# Patient Record
Sex: FEMALE | Race: OTHER | URBAN - METROPOLITAN AREA
[De-identification: names, ages, dates, MRNs, and addresses within clinical notes are randomized per-mention and may not be internally consistent; named-entity substitution may affect disease eponyms.]

---

## 2017-04-04 ENCOUNTER — IMPORTED ENCOUNTER (OUTPATIENT)
Dept: URBAN - METROPOLITAN AREA CLINIC 50 | Facility: CLINIC | Age: 42
End: 2017-04-04

## 2017-09-18 ENCOUNTER — IMPORTED ENCOUNTER (OUTPATIENT)
Dept: URBAN - METROPOLITAN AREA CLINIC 50 | Facility: CLINIC | Age: 42
End: 2017-09-18

## 2018-03-02 ENCOUNTER — IMPORTED ENCOUNTER (OUTPATIENT)
Dept: URBAN - METROPOLITAN AREA CLINIC 50 | Facility: CLINIC | Age: 43
End: 2018-03-02

## 2018-04-09 ENCOUNTER — IMPORTED ENCOUNTER (OUTPATIENT)
Dept: URBAN - METROPOLITAN AREA CLINIC 50 | Facility: CLINIC | Age: 43
End: 2018-04-09

## 2018-05-08 ENCOUNTER — IMPORTED ENCOUNTER (OUTPATIENT)
Dept: URBAN - METROPOLITAN AREA CLINIC 50 | Facility: CLINIC | Age: 43
End: 2018-05-08

## 2018-11-26 ENCOUNTER — IMPORTED ENCOUNTER (OUTPATIENT)
Dept: URBAN - METROPOLITAN AREA CLINIC 50 | Facility: CLINIC | Age: 43
End: 2018-11-26

## 2019-01-07 ENCOUNTER — IMPORTED ENCOUNTER (OUTPATIENT)
Dept: URBAN - METROPOLITAN AREA CLINIC 50 | Facility: CLINIC | Age: 44
End: 2019-01-07

## 2019-01-15 ENCOUNTER — IMPORTED ENCOUNTER (OUTPATIENT)
Dept: URBAN - METROPOLITAN AREA CLINIC 50 | Facility: CLINIC | Age: 44
End: 2019-01-15

## 2019-01-25 ENCOUNTER — IMPORTED ENCOUNTER (OUTPATIENT)
Dept: URBAN - METROPOLITAN AREA CLINIC 50 | Facility: CLINIC | Age: 44
End: 2019-01-25

## 2019-03-05 ENCOUNTER — IMPORTED ENCOUNTER (OUTPATIENT)
Dept: URBAN - METROPOLITAN AREA CLINIC 50 | Facility: CLINIC | Age: 44
End: 2019-03-05

## 2019-03-15 ENCOUNTER — IMPORTED ENCOUNTER (OUTPATIENT)
Dept: URBAN - METROPOLITAN AREA CLINIC 50 | Facility: CLINIC | Age: 44
End: 2019-03-15

## 2019-03-15 NOTE — PATIENT DISCUSSION
"""Trials given to patient today. Patient to call with progress to finalize prescription.  Reviewed ""

## 2019-03-22 ENCOUNTER — IMPORTED ENCOUNTER (OUTPATIENT)
Dept: URBAN - METROPOLITAN AREA CLINIC 50 | Facility: CLINIC | Age: 44
End: 2019-03-22

## 2019-09-24 ENCOUNTER — IMPORTED ENCOUNTER (OUTPATIENT)
Dept: URBAN - METROPOLITAN AREA CLINIC 50 | Facility: CLINIC | Age: 44
End: 2019-09-24

## 2019-10-01 NOTE — PATIENT DISCUSSION
Custom monovision helps to lessen the dependence on glasses, but is a compromise and glasses are often still needed for some activities including driving, binocular vision, and intermediate area activities. The patient understands there is a possibility they may need an enhancement after surgery. The patient elects Custom Vision OS, goal of emmetropia.

## 2019-10-07 NOTE — PATIENT DISCUSSION
Custom monovision helps to lessen the dependence on glasses, but is a compromise and glasses are often still needed for some activities including driving, binocular vision, and intermediate area activities. The patient understands there is a possibility they may need an enhancement after surgery. The patient elects Custom Vision OS, goal of emmetropia. chest pain

## 2019-10-08 NOTE — PATIENT DISCUSSION
Cataract surgery has been performed in the first eye and activities of daily living are still impaired. The patient would like to proceed with cataract surgery in the second eye as scheduled. The patient elects IOL OD CV -2.00 Target.

## 2019-10-14 NOTE — PATIENT DISCUSSION
Cataract surgery has been performed in the first eye and activities of daily living are still impaired. The patient would like to proceed with cataract surgery in the second eye as scheduled. The patient elects Custom Vision OD, goal of --2.00 target.

## 2019-11-01 NOTE — PATIENT DISCUSSION
V/S, C/O distortion. Will likely need PPV/MP OU.  Will hold on sx till CME resolves, will start with OS first.

## 2019-11-12 ENCOUNTER — IMPORTED ENCOUNTER (OUTPATIENT)
Dept: URBAN - METROPOLITAN AREA CLINIC 50 | Facility: CLINIC | Age: 44
End: 2019-11-12

## 2019-12-18 NOTE — PATIENT DISCUSSION
Membrane approaching visual significance.  Will monitor for now.  Will likely need PPV/MP in future.

## 2019-12-18 NOTE — PATIENT DISCUSSION
PO related CME, decreased since last visit, continue Pred taper TID, BID, QDAY @ 2 week intervals then stop.

## 2020-06-17 NOTE — PATIENT DISCUSSION
After discussion of risks, benefits, and alternatives, patient elects Vitrectomy with membrane peeling.

## 2020-06-17 NOTE — PATIENT DISCUSSION
PPV/MP(25G) scheduled for 6/25/2020 After discussion of risks, benefits, and alternatives, including loss of vision, blindness, need for additional surgery and/or retinal detachment, patient elects Retinal surgery.

## 2020-06-25 NOTE — PATIENT DISCUSSION
Post-op instructions given. Discussed drops-Start Pred QID, Moxi QID, positioning, no strenuous activity and eye protection. Call immediately if eye pain or loss of vision.

## 2020-07-06 NOTE — PATIENT DISCUSSION
Post-op instructions given. Discussed drops-Continue Pred QID, D/C MOXI, positioning, no strenuous activity and eye protection. Call immediately if eye pain or loss of vision.

## 2020-07-06 NOTE — PATIENT DISCUSSION
Post-op week 2 -No Eye Pain, Doing well, retina attached, good IOP with no signs of endophthalmitis.

## 2020-08-10 NOTE — PATIENT DISCUSSION
Post-op instructions given. Discussed drops-Continue Pred-Taper TID, BID, QDAY @ 2 week intervals then stop, positioning, no strenuous activity and eye protection. Call immediately if eye pain or loss of vision.

## 2020-08-10 NOTE — PATIENT DISCUSSION
Post-op week 6 -No Eye Pain, Doing well, retina attached, good IOP with no signs of endophthalmitis.

## 2020-09-28 NOTE — PATIENT DISCUSSION
Post-op month 3-No Eye Pain, Doing well, retina attached, good IOP with no signs of endophthalmitis.

## 2021-01-06 ENCOUNTER — IMPORTED ENCOUNTER (OUTPATIENT)
Dept: URBAN - METROPOLITAN AREA CLINIC 50 | Facility: CLINIC | Age: 46
End: 2021-01-06

## 2021-02-23 NOTE — PROCEDURE NOTE: SURGICAL
<p>Prior to commencing surgery patient identification, surgical procedure, site, and side were confirmed by Dr. Meraz.&nbsp; Following topical proparacaine anesthesia, the patient was positioned at the YAG laser, a contact lens coupled to the cornea of the right eye with methylcellulose and an axial posterior capsulotomy performed without complication using 3.6 Mj x 26. Attention was then turned to the left eye and a contact lens coupled to the cornea of the left eye with methylcellulose and an axial posterior capsulotomy performed without complication using 3.7 Mj x 15. One drop of Alphagan was instilled in both eyes and the patient returned to the holding area having tolerated the procedure well and without complication. </p><p>MRN [id number]</p>

## 2021-04-17 ASSESSMENT — TONOMETRY
OS_IOP_MMHG: 18
OS_IOP_MMHG: 18
OD_IOP_MMHG: 18
OD_IOP_MMHG: 18
OD_IOP_MMHG: 16
OD_IOP_MMHG: 16
OS_IOP_MMHG: 18
OS_IOP_MMHG: 16
OD_IOP_MMHG: 19
OS_IOP_MMHG: 21
OD_IOP_MMHG: 20
OS_IOP_MMHG: 16

## 2021-04-17 ASSESSMENT — VISUAL ACUITY
OD_CC: 20/50+2
OD_CC: J1@ 14 IN
OD_CC: 20/40
OD_CC: J3
OS_CC: J1@ 14 IN
OS_CC: 20/25
OD_CC: 20/30-2
OS_CC: 20/25
OS_CC: 20/25
OD_CC: J1+
OS_CC: J1
OS_CC: 20/100
OS_CC: J1
OD_CC: 20/100
OS_CC: 20/25
OD_CC: J1
OD_PH: @ 17 IN
OS_CC: J1+
OD_CC: 20/25
OD_CC: 20/60
OD_CC: 20/25-2
OS_CC: 20/25
OS_CC: 20/25
OD_CC: 20/25-
OS_CC: 20/25+
OD_CC: 20/25
OD_PH: 20/40
OS_CC: 20/25-1
OD_CC: J1
OS_CC: J3

## 2021-12-31 ENCOUNTER — PREPPED CHART (OUTPATIENT)
Dept: URBAN - METROPOLITAN AREA CLINIC 48 | Facility: CLINIC | Age: 46
End: 2021-12-31

## 2022-06-01 NOTE — PATIENT DISCUSSION
Pt C/O a Dull Ache in the right eye that lasted 5 minutes, Likely due to Dry surface, Recc Art tears 4x a day, Consider Plugs and Restasis.

## 2022-12-15 NOTE — PATIENT DISCUSSION
Establish and follow-up with primary care physician for reevaluation. Call for an appointment  Returnto the emergency department immediately any pain fever chills nausea vomiting dizzy lightheadedness or worsening symptoms  Take Tylenol alternating with Motrin for any pain aches and fevers  Take over-the-counter cough medicine as needed for cough and cold symptoms. Post op gtt instructions reviewed with patient.

## 2024-03-24 NOTE — PATIENT DISCUSSION
Recommended against surgery for now given good vision and lack of impact on activities of daily living. Negative